# Patient Record
Sex: FEMALE | Race: AMERICAN INDIAN OR ALASKA NATIVE
[De-identification: names, ages, dates, MRNs, and addresses within clinical notes are randomized per-mention and may not be internally consistent; named-entity substitution may affect disease eponyms.]

---

## 2017-08-21 NOTE — MAMMOGRAPHY REPORT
BONE DENSITY STUDY:



Osteoporosis screening.



DEFINITIONS:

  BMD     = Bone Mineral Density

  T-score = BMD related to mean peak bone mass of young adult

            (mean expressed in Standard Deviation)

  Z-score = Age matched BMD expressed in SD



World Health Organization (WHO) Diagnostic Criteria

  Normal             T-score > -1 SD

  Osteopenia      T-score between -1 and -2.4 SD

  Osteoporosis    T-score -2.5 SD or below



FINDINGS:

The weighted average BMD of lumbar spine L1-L4 is 0.925 with a

T-score of -2.0.



The weighted average BMD of the left hip is 0.847 with a T-score of 

-1.2.



IMPRESSION:

The patient's average T-score is diagnostic for osteopenia and average

relative risk for fracture.



NOTE:

BMD is not the only risk factor for fracture; also consider

factors such as the patient's age, risk of falling, previous

osteoporotic fracture, family history of osteoporotic fractures, 

current smoker, and low body weight.



[place]'s triangle is a region of interest in femur, predominantly

of trabecular bone.  It is not a true anatomic site, and ISCD does not 

recommend its use clinically.

## 2019-06-28 ENCOUNTER — HOSPITAL ENCOUNTER (OUTPATIENT)
Dept: HOSPITAL 5 - SPVWC | Age: 59
Discharge: HOME | End: 2019-06-28
Attending: OBSTETRICS & GYNECOLOGY
Payer: COMMERCIAL

## 2019-06-28 DIAGNOSIS — Z12.31: Primary | ICD-10-CM

## 2019-06-28 DIAGNOSIS — E78.5: ICD-10-CM

## 2019-06-28 DIAGNOSIS — I10: ICD-10-CM

## 2019-06-28 PROCEDURE — 77067 SCR MAMMO BI INCL CAD: CPT

## 2019-06-28 NOTE — MAMMOGRAPHY REPORT
BILATERAL DIGITAL SCREENING MAMMOGRAM with CAD: 06/28/19 10:16:00



CLINICAL: Routine screening.



COMPARISON: 02/24/17



FINDINGS: The breasts are heterogeneously dense, which may obscure a 

small masses.No mass, architectural distortion or suspicious 

calcifications.



IMPRESSION: No mammographic evidence of malignancy.



BI-RADS CATEGORY:  1 -- Negative



RECOMMENDATION: Routine mammographic screening in one year.

## 2020-12-30 ENCOUNTER — HOSPITAL ENCOUNTER (OUTPATIENT)
Dept: HOSPITAL 5 - SPVWC | Age: 60
Discharge: HOME | End: 2020-12-30
Attending: PHYSICIAN ASSISTANT
Payer: COMMERCIAL

## 2020-12-30 DIAGNOSIS — M81.0: ICD-10-CM

## 2020-12-30 DIAGNOSIS — Z12.31: Primary | ICD-10-CM

## 2020-12-30 DIAGNOSIS — M85.80: ICD-10-CM

## 2020-12-30 PROCEDURE — 77080 DXA BONE DENSITY AXIAL: CPT

## 2020-12-30 PROCEDURE — 77067 SCR MAMMO BI INCL CAD: CPT

## 2020-12-30 NOTE — MAMMOGRAPHY REPORT
DEXA BONE DENSITY SCAN



INDICATION / CLINICAL INFORMATION:

Osteoporosis.

60 years Female



COMPARISON: 

DEXA scan from 8/21/2017.



LUMBAR SPINE, L1-L4:

- Bone mineral density (BMD) = 0.958 g/cm2.

- T-score = -1.7 

- Z-score = -0.1 



Change (%) since most recent prior (if available):  +3.6



No imaging of the hips was performed.







IMPRESSION:

1. WHO Classification: Osteopenia. Fracture Risk: Increased.







=================================================================

BMD Reporting Guidelines (ISCD, 2015)

=================================================================

BMD Reporting in Postmenopausal Women and in Men Age 50 and Older

*  T-scores are preferred.

*  The WHO densitometric classification is applicable.

BMD Reporting in Females Prior to Menopause and in Males Younger Than Age 50

*  Z-scores, not T-scores, are preferred. This is particularly important in children.

*  A Z-score of -2.0 or lower is defined as below the expected range for age, and a Z-score above -2.
0 is within the expected range for age.

*  Osteoporosis cannot be diagnosed in men under age 50 on the basis of BMD alone.

*  The WHO diagnostic criteria may be applied to women in the menopausal transition.





http://www.iscd.org/official-positions/7077-uxec-xaahhbul-positions-adult/





Signer Name: Brian Valadez MD 

Signed: 12/30/2020 2:45 PM

Workstation Name: [id number]

## 2020-12-30 NOTE — MAMMOGRAPHY REPORT
DIGITAL SCREENING MAMMOGRAM WITH CAD, 12/30/2020



CLINICAL INFORMATION / INDICATION: Routine screening mammography. 



TECHNIQUE:  Digital bilateral 2D mammography was obtained in the craniocaudal and mediolateral obliqu
e projections. This examination was interpreted with the benefit of Computer-Aided Detection analysis
.



COMPARISON: 6/28/2019, 2/24/2017



FINDINGS: 



Breast Density: The breasts are heterogeneously dense, which may obscure small masses.



No dominant mass, suspicious calcifications, or architectural distortion in either breast. 



No interval change.

 

IMPRESSION: No mammographic evidence of malignancy.



Follow up recommendation: Routine yearly



BI-RADS Category 1:  Negative.





-------------------------------------------------------------------------------------------

A "normal" or negative report should not discourage follow up or biopsy of a clinically significant f
inding.



A written summary of these findings will be mailed to the patient. The patient will be entered into a
 mammography reporting system which will generate a reminder letter for the patient's next appointmen
t at the appropriate interval.



The [employer] College [employer] recommends yearly mammograms starting at age 40 and continuing as l
jeff as a woman is in good health.  Breast MRI is recommended for women with an approximate 20-25% or 
greater lifetime risk of breast cancer, including women with a strong family history of breast or ova
beka cancer or who have been treated for Hodgkin's disease.



Signer Name: Tessa Mejias MD 

Signed: 12/30/2020 2:25 PM

Workstation Name: [id number]

## 2022-09-14 ENCOUNTER — HOSPITAL ENCOUNTER (OUTPATIENT)
Dept: HOSPITAL 5 - SPVWC | Age: 62
Discharge: HOME | End: 2022-09-14
Attending: PHYSICIAN ASSISTANT
Payer: COMMERCIAL

## 2022-09-14 DIAGNOSIS — Z12.31: Primary | ICD-10-CM

## 2022-09-14 PROCEDURE — 77067 SCR MAMMO BI INCL CAD: CPT

## 2022-09-14 NOTE — MAMMOGRAPHY REPORT
DIGITAL SCREENING MAMMOGRAM WITH CAD, 9/14/2022



CLINICAL INFORMATION / INDICATION: Routine screening mammography.



TECHNIQUE: Digital bilateral 2D mammography was obtained in the craniocaudal and mediolateral oblique
 projections. This examination was interpreted with the benefit of Computer-Aided Detection analysis.




COMPARISON: 2/24/2017 through 12/30/2020.



FINDINGS: 



Breast Density: The breasts are heterogeneously dense, which may obscure small masses.



No dominant mass, suspicious calcifications, or architectural distortion in either breast. 





 

IMPRESSION: No mammographic evidence of malignancy.



Follow up recommendation: Routine yearly screening mammogram.



BI-RADS Category 1:  NEGATIVE





-------------------------------------------------------------------------------------------

A "normal" or negative report should not discourage follow up or biopsy of a clinically significant f
inding.



A written summary of these findings will be mailed to the patient. The patient will be entered into a
 mammography reporting system which will generate a reminder letter for the patient's next appointmen
t at the appropriate interval.



The [employer] recommends yearly mammograms starting at age 40 and continuing as l
jeff as a woman is in good health.  Breast MRI is recommended for women with an approximate 20-25% or 
greater lifetime risk of breast cancer, including women with a strong family history of breast or ova
beka cancer or who have been treated for Hodgkin's disease.



Signer Name: Ken Smith MD 

Signed: 9/14/2022 11:45 AM

Workstation Name: [id number]